# Patient Record
Sex: MALE | NOT HISPANIC OR LATINO | URBAN - METROPOLITAN AREA
[De-identification: names, ages, dates, MRNs, and addresses within clinical notes are randomized per-mention and may not be internally consistent; named-entity substitution may affect disease eponyms.]

---

## 2024-09-03 ENCOUNTER — APPOINTMENT (RX ONLY)
Dept: URBAN - METROPOLITAN AREA CLINIC 61 | Facility: CLINIC | Age: 69
Setting detail: DERMATOLOGY
End: 2024-09-03

## 2024-09-03 DIAGNOSIS — D485 NEOPLASM OF UNCERTAIN BEHAVIOR OF SKIN: ICD-10-CM

## 2024-09-03 PROBLEM — D48.5 NEOPLASM OF UNCERTAIN BEHAVIOR OF SKIN: Status: ACTIVE | Noted: 2024-09-03

## 2024-09-03 PROCEDURE — 99202 OFFICE O/P NEW SF 15 MIN: CPT

## 2024-09-03 PROCEDURE — ? PATIENT SPECIFIC COUNSELING

## 2024-09-03 ASSESSMENT — LOCATION ZONE DERM: LOCATION ZONE: SCALP

## 2024-09-03 ASSESSMENT — LOCATION DETAILED DESCRIPTION DERM: LOCATION DETAILED: POSTERIOR MID-PARIETAL SCALP

## 2024-09-03 ASSESSMENT — LOCATION SIMPLE DESCRIPTION DERM: LOCATION SIMPLE: POSTERIOR SCALP

## 2024-09-03 NOTE — HPI: SKIN LESION
How Severe Is Your Skin Lesion?: mild
Has Your Skin Lesion Been Treated?: not been treated
Is This A New Presentation, Or A Follow-Up?: Skin Lesion Referral
Who Is Your Referring Provider?: Espinoza Hayes

## 2024-09-03 NOTE — PROCEDURE: PATIENT SPECIFIC COUNSELING
Panchito follows up in clinic for consultation from his primary care provider Espinoza Hayes. He reports this non healing papule has been present for 10+ years. Given the history of not healing and being present for 10+ years I do recommend a biopsy. Nakul reports he is unable to pay for a biopsy at this time given financial restraints. We discussed this looks concerning for a basal cell carcinoma vs a squamous cell carcinoma. He was given a cost estimate in office and provided with billing codes to discuss with his insurance. Reviewed I recommend proceeding with a biopsy. If this is a skin cancer we reviewed it will continue to grow wider as well as deeper and if left untreated for long periods of time can become problematic to his overall health. He is willing to accept the risks. He will contact the office if he chooses to schedule biopsy. He understands and agrees.
Detail Level: Zone